# Patient Record
Sex: FEMALE | Race: WHITE
[De-identification: names, ages, dates, MRNs, and addresses within clinical notes are randomized per-mention and may not be internally consistent; named-entity substitution may affect disease eponyms.]

---

## 2019-08-04 NOTE — NUR
DR. SANTIAGO NOTIFIED AND REVIEWD PTS BEHAVIOR AND ASSESSMENT RESULTS.PT IS A
LOW RISK PER DR. SANTIAGO. DR. SANTIAGO STATED TO GIVE RESOURCES TO PT AT TIME
OF DISCHARGE. NO FURTHER ORDERS AT THIS TIME. RESOURCES REVIEWED WITH PT AND
SHE VERBALIZED UNDERSTANDING.

## 2019-10-18 NOTE — OP
PATIENT NAME:  BRAYDEN GREEN                            MEDICAL RECORD: [id number]
:[birth year]                                             LOCATION:D.OPS          
                                                         ADMISSION DATE:        
SURGEON:  ROSITA MATHEWS DO         
 
 
DATE OF OPERATION:  10/18/2019
 
PROCEDURE PERFORMED:  Right shoulder arthroscopy with subacromial decompression
and biceps tenodesis.
 
PREOPERATIVE DIAGNOSES:  Right shoulder subacromial impingement and SLAP tear
type 2.
 
POSTOPERATIVE DIAGNOSES:  Right shoulder subacromial impingement and SLAP tear
type 2.
 
INDICATIONS:  Ms. Green is a 38-year-old female who has had several months to
close to a year of shoulder pain and has hurt her for quite some time and she is
tired of dealing with the pain.  She has gone through a series of physical
therapy and injections to no avail.  She wanted something done surgically.  She
was tired of it affecting her activities of daily living.  I informed her of the
risks and benefits of the procedure including infection, bleeding, damage to
nerves and vessels, need for further surgery, continued shoulder pain, and she
signed the consent.
 
SURGEON:  Rosita Mathews DO
 
DESCRIPTION OF PROCEDURE:  The patient was taken to the operative suite, laid in
the left lateral decubitus position with the right shoulder up.  A timeout was
performed and everyone was in agreement with the correct side, site, patient and
procedure.  She was sedated and LMA was placed.  The right shoulder was then
prepped and draped in sterile fashion after the timeout had been performed and
procedure began.  She received 900 mg clindamycin preoperatively.  The shoulder
was then inflated with 60 mL of normal saline through the posterior portal with
an 18-gauge spinal needle and then the posterior portal was established with an
11-blade scalpel.  A trocar was then entered into the shoulder joint at that
time and entering the shoulder, the anterior portal was then established with an
18-gauge spinal needle and 11-blade scalpel.  Trocar was then entered in.  The
cartilage appeared to be in good condition as well as the rotator cuff, the
subscapularis, the supraspinatus, infraspinatus, and there was nothing in the
inferior gutter.  However, the labrum was torn, type 2 SLAP tear.  Burner was
then brought in and a bicep tenotomy was performed at that point.  We then
switched the scope to the subacromial space and a lateral portal was established
with an 18-gauge spinal needle and 11-blade scalpel.  A subacromial
decompression was done.  The acromion was cleaned off.  Any spurs were removed
at that time and acromioplasty was performed.  So the bursa was then removed
with the shaver through the lateral portal.  The AC joint was checked and was
not arthritic.  The attention was then drawn to the bicep tenodesis part and an
incision was made on the anterior humerus.  Careful dissection was made down to
the long head of bicep tendon.  This was pulled out through the incision and
whipstitched and then JuggerKnot was placed on the anterior humerus
unicortically.  We then put the tendon through the loop and then tied it to the
JuggerKnot suture and cinched down to the humerus and I get a nice tenodesis
site.  The suture was then cut and tied and then a free needle was used to tie
through the bicep tendon.  This was tied down.  Excess tendon and suture were
cut at that time and the site was irrigated.  It was closed with 2-0 Vicryl in
inverted interrupted fashion and 4-0 Monocryl ran on the skin and 4-0 Monocryl
 
 
 
OPERATIVE REPORT                               [id number]    BRAYDEN GREEN          
 
 
on each of the portal sites.  She was awakened and taken to recovery in stable
condition.
 
BLOOD LOSS:  Minimal.
 
COMPLICATIONS:  None.
 
TRANSINT:[id number] Voice Confirmation ID: [id number] DOCUMENT ID: [id number]
                                           
                                           ROSITA MATHEWS DO         
 
 
 
Electronically Signed by ROSITA NÚÑEZ on 10/18/19 at 1229
 
 
 
 
 
 
 
 
 
 
 
 
 
 
 
 
 
 
 
 
 
 
 
 
 
 
 
 
 
 
 
 
 
CC:                                                             [id number]
DICTATION DATE: 10/18/19 1149     :     10/18/19 1210      [id number] [place] [address]

## 2020-03-01 ENCOUNTER — HOSPITAL ENCOUNTER (EMERGENCY)
Dept: HOSPITAL 84 - D.ER | Age: 40
Discharge: HOME | End: 2020-03-01
Payer: MEDICAID

## 2020-03-01 VITALS — DIASTOLIC BLOOD PRESSURE: 99 MMHG | SYSTOLIC BLOOD PRESSURE: 151 MMHG

## 2020-03-01 VITALS
BODY MASS INDEX: 27.62 KG/M2 | HEIGHT: 67 IN | WEIGHT: 176 LBS | WEIGHT: 176 LBS | BODY MASS INDEX: 27.62 KG/M2 | HEIGHT: 67 IN

## 2020-03-01 DIAGNOSIS — I25.2: ICD-10-CM

## 2020-03-01 DIAGNOSIS — S46.812A: ICD-10-CM

## 2020-03-01 DIAGNOSIS — Z72.0: ICD-10-CM

## 2020-03-01 DIAGNOSIS — M25.512: Primary | ICD-10-CM

## 2020-03-17 ENCOUNTER — HOSPITAL ENCOUNTER (OUTPATIENT)
Dept: HOSPITAL 84 - D.MRI | Age: 40
Discharge: HOME | End: 2020-03-17
Attending: CLINICAL NURSE SPECIALIST
Payer: MEDICAID

## 2020-03-17 VITALS — BODY MASS INDEX: 20.7 KG/M2

## 2020-03-17 DIAGNOSIS — M25.512: Primary | ICD-10-CM

## 2020-03-23 ENCOUNTER — HOSPITAL ENCOUNTER (EMERGENCY)
Dept: HOSPITAL 84 - D.ER | Age: 40
Discharge: HOME | End: 2020-03-23
Payer: MEDICAID

## 2020-03-23 VITALS
BODY MASS INDEX: 28.31 KG/M2 | WEIGHT: 180.38 LBS | HEIGHT: 67 IN | BODY MASS INDEX: 28.31 KG/M2 | WEIGHT: 180.38 LBS | HEIGHT: 67 IN

## 2020-03-23 VITALS — DIASTOLIC BLOOD PRESSURE: 83 MMHG | SYSTOLIC BLOOD PRESSURE: 117 MMHG

## 2020-03-23 DIAGNOSIS — K04.7: Primary | ICD-10-CM

## 2020-03-23 DIAGNOSIS — K08.89: ICD-10-CM

## 2020-06-25 ENCOUNTER — HOSPITAL ENCOUNTER (EMERGENCY)
Dept: HOSPITAL 84 - D.ER | Age: 40
Discharge: HOME | End: 2020-06-25
Payer: MEDICAID

## 2020-06-25 VITALS — BODY MASS INDEX: 28.25 KG/M2 | HEIGHT: 67 IN | WEIGHT: 180 LBS

## 2020-06-25 VITALS — SYSTOLIC BLOOD PRESSURE: 104 MMHG | DIASTOLIC BLOOD PRESSURE: 63 MMHG

## 2020-06-25 DIAGNOSIS — D72.829: ICD-10-CM

## 2020-06-25 DIAGNOSIS — R11.0: ICD-10-CM

## 2020-06-25 DIAGNOSIS — R31.9: ICD-10-CM

## 2020-06-25 DIAGNOSIS — R10.32: Primary | ICD-10-CM

## 2020-06-25 DIAGNOSIS — R73.9: ICD-10-CM

## 2020-06-25 LAB
ALBUMIN SERPL-MCNC: 3.8 G/DL (ref 3.4–5)
ALP SERPL-CCNC: 94 U/L (ref 30–120)
ALT SERPL-CCNC: 29 U/L (ref 10–68)
AMYLASE SERPL-CCNC: 41 U/L (ref 25–115)
ANION GAP SERPL CALC-SCNC: 11.7 MMOL/L (ref 8–16)
BACTERIA #/AREA URNS HPF: (no result) /HPF
BASOPHILS NFR BLD AUTO: 0.1 % (ref 0–2)
BILIRUB SERPL-MCNC: 0.3 MG/DL (ref 0.2–1.3)
BILIRUB SERPL-MCNC: NEGATIVE MG/DL
BUN SERPL-MCNC: 12 MG/DL (ref 7–18)
CALCIUM SERPL-MCNC: 8.7 MG/DL (ref 8.5–10.1)
CHLORIDE SERPL-SCNC: 102 MMOL/L (ref 98–107)
CO2 SERPL-SCNC: 25.1 MMOL/L (ref 21–32)
CREAT SERPL-MCNC: 0.9 MG/DL (ref 0.6–1.3)
EOSINOPHIL NFR BLD: 1.5 % (ref 0–7)
ERYTHROCYTE [DISTWIDTH] IN BLOOD BY AUTOMATED COUNT: 17.2 % (ref 11.5–14.5)
GLOBULIN SER-MCNC: 3.7 G/L
GLUCOSE SERPL-MCNC: 287 MG/DL (ref 74–106)
GLUCOSE SERPL-MCNC: 500 MG/DL
HCG UR QL: NEGATIVE
HCT VFR BLD CALC: 45.2 % (ref 36–48)
HGB BLD-MCNC: 14.7 G/DL (ref 12–16)
IMM GRANULOCYTES NFR BLD: 0.3 % (ref 0–5)
KETONES UR STRIP-MCNC: NEGATIVE MG/DL
LIPASE SERPL-CCNC: 237 U/L (ref 73–393)
LYMPHOCYTES NFR BLD AUTO: 24.6 % (ref 15–50)
MCH RBC QN AUTO: 26.8 PG (ref 26–34)
MCHC RBC AUTO-ENTMCNC: 32.5 G/DL (ref 31–37)
MCV RBC: 82.5 FL (ref 80–100)
MONOCYTES NFR BLD: 6.9 % (ref 2–11)
NEUTROPHILS NFR BLD AUTO: 66.6 % (ref 40–80)
NITRITE UR-MCNC: NEGATIVE MG/ML
OSMOLALITY SERPL CALC.SUM OF ELEC: 279 MOSM/KG (ref 275–300)
PH UR STRIP: 5 [PH] (ref 5–6)
PLATELET # BLD: 292 10X3/UL (ref 130–400)
PMV BLD AUTO: 10.7 FL (ref 7.4–10.4)
POTASSIUM SERPL-SCNC: 3.8 MMOL/L (ref 3.5–5.1)
PROT SERPL-MCNC: 7.5 G/DL (ref 6.4–8.2)
RBC # BLD AUTO: 5.48 10X6/UL (ref 4–5.4)
RBC #/AREA URNS HPF: (no result) /HPF (ref 0–5)
SODIUM SERPL-SCNC: 135 MMOL/L (ref 136–145)
SP GR UR STRIP: 1.02 (ref 1–1.02)
SQUAMOUS #/AREA URNS HPF: (no result) /HPF (ref 0–5)
TROPONIN I SERPL-MCNC: < 0.017 NG/ML (ref 0–0.06)
UROBILINOGEN UR-MCNC: NORMAL MG/DL
WBC # BLD AUTO: 12.2 10X3/UL (ref 4.8–10.8)
WBC #/AREA URNS HPF: (no result) /HPF

## 2020-07-12 ENCOUNTER — HOSPITAL ENCOUNTER (EMERGENCY)
Dept: HOSPITAL 84 - D.ER | Age: 40
Discharge: HOME | End: 2020-07-12
Payer: MEDICAID

## 2020-07-12 VITALS
HEIGHT: 67 IN | BODY MASS INDEX: 29.82 KG/M2 | WEIGHT: 190 LBS | BODY MASS INDEX: 29.82 KG/M2 | WEIGHT: 190 LBS | HEIGHT: 67 IN

## 2020-07-12 VITALS — SYSTOLIC BLOOD PRESSURE: 116 MMHG | DIASTOLIC BLOOD PRESSURE: 70 MMHG

## 2020-07-12 DIAGNOSIS — N23: ICD-10-CM

## 2020-07-12 DIAGNOSIS — E11.65: Primary | ICD-10-CM

## 2020-07-12 LAB
ALBUMIN SERPL-MCNC: 3.4 G/DL (ref 3.4–5)
ALP SERPL-CCNC: 98 U/L (ref 30–120)
ALT SERPL-CCNC: 24 U/L (ref 10–68)
ANION GAP SERPL CALC-SCNC: 11.9 MMOL/L (ref 8–16)
BILIRUB SERPL-MCNC: 0.27 MG/DL (ref 0.2–1.3)
BILIRUB SERPL-MCNC: NEGATIVE MG/DL
BUN SERPL-MCNC: 11 MG/DL (ref 7–18)
CALCIUM SERPL-MCNC: 8.7 MG/DL (ref 8.5–10.1)
CHLORIDE SERPL-SCNC: 99 MMOL/L (ref 98–107)
CO2 SERPL-SCNC: 24.7 MMOL/L (ref 21–32)
CREAT SERPL-MCNC: 1 MG/DL (ref 0.6–1.3)
ERYTHROCYTE [DISTWIDTH] IN BLOOD BY AUTOMATED COUNT: 16.4 % (ref 11.5–14.5)
EST. AVERAGE GLUCOSE BLD GHB EST-MCNC: 269 MG/DL (ref 74–154)
GLOBULIN SER-MCNC: 3.3 G/L
GLUCOSE SERPL-MCNC: 1000 MG/DL
GLUCOSE SERPL-MCNC: 412 MG/DL (ref 74–106)
HCG SERPL-ACNC: 0 MIU/ML
HCT VFR BLD CALC: 42.2 % (ref 36–48)
HGB BLD-MCNC: 13.7 G/DL (ref 12–16)
KETONES UR STRIP-MCNC: NEGATIVE MG/DL
LIPASE SERPL-CCNC: 190 U/L (ref 73–393)
LYMPHOCYTES NFR BLD AUTO: 23.7 % (ref 15–50)
MCH RBC QN AUTO: 27.1 PG (ref 26–34)
MCHC RBC AUTO-ENTMCNC: 32.5 G/DL (ref 31–37)
MCV RBC: 83.4 FL (ref 80–100)
NEUTROPHILS NFR BLD AUTO: 70.8 % (ref 40–80)
NITRITE UR-MCNC: NEGATIVE MG/ML
OSMOLALITY SERPL CALC.SUM OF ELEC: 280 MOSM/KG (ref 275–300)
PH UR STRIP: 6 [PH] (ref 5–6)
PLATELET # BLD: 270 10X3/UL (ref 130–400)
PMV BLD AUTO: 10.5 FL (ref 7.4–10.4)
POTASSIUM SERPL-SCNC: 3.6 MMOL/L (ref 3.5–5.1)
PROT SERPL-MCNC: 6.7 G/DL (ref 6.4–8.2)
RBC # BLD AUTO: 5.06 10X6/UL (ref 4–5.4)
SODIUM SERPL-SCNC: 132 MMOL/L (ref 136–145)
SP GR UR STRIP: 1.01 (ref 1–1.02)
UROBILINOGEN UR-MCNC: NORMAL MG/DL
WBC # BLD AUTO: 10.2 10X3/UL (ref 4.8–10.8)

## 2020-08-18 ENCOUNTER — HOSPITAL ENCOUNTER (EMERGENCY)
Dept: HOSPITAL 84 - D.ER | Age: 40
Discharge: LEFT BEFORE DISCHARGE | End: 2020-08-18
Payer: MEDICAID

## 2020-08-18 VITALS — WEIGHT: 170.36 LBS | BODY MASS INDEX: 26.74 KG/M2 | HEIGHT: 67 IN

## 2020-08-18 VITALS — SYSTOLIC BLOOD PRESSURE: 132 MMHG | DIASTOLIC BLOOD PRESSURE: 88 MMHG

## 2020-08-18 DIAGNOSIS — M25.511: Primary | ICD-10-CM

## 2020-09-21 ENCOUNTER — HOSPITAL ENCOUNTER (OUTPATIENT)
Dept: HOSPITAL 84 - D.MRI | Age: 40
Discharge: HOME | End: 2020-09-21
Attending: ORTHOPAEDIC SURGERY
Payer: MEDICAID

## 2020-09-21 VITALS — BODY MASS INDEX: 26.7 KG/M2

## 2020-09-21 DIAGNOSIS — M25.511: Primary | ICD-10-CM

## 2020-10-04 ENCOUNTER — HOSPITAL ENCOUNTER (EMERGENCY)
Dept: HOSPITAL 84 - D.ER | Age: 40
Discharge: HOME | End: 2020-10-04
Payer: MEDICAID

## 2020-10-04 VITALS — WEIGHT: 165.35 LBS | HEIGHT: 67 IN | BODY MASS INDEX: 25.95 KG/M2

## 2020-10-04 VITALS — SYSTOLIC BLOOD PRESSURE: 151 MMHG | DIASTOLIC BLOOD PRESSURE: 96 MMHG

## 2020-10-04 DIAGNOSIS — M25.511: Primary | ICD-10-CM

## 2020-10-08 ENCOUNTER — HOSPITAL ENCOUNTER (OUTPATIENT)
Dept: HOSPITAL 84 - D.OPS | Age: 40
Discharge: HOME | End: 2020-10-08
Attending: ORTHOPAEDIC SURGERY
Payer: MEDICAID

## 2020-10-08 VITALS — HEIGHT: 67 IN | BODY MASS INDEX: 28.25 KG/M2 | WEIGHT: 180 LBS

## 2020-10-08 DIAGNOSIS — Z79.84: ICD-10-CM

## 2020-10-08 DIAGNOSIS — Z86.73: ICD-10-CM

## 2020-10-08 DIAGNOSIS — M25.511: Primary | ICD-10-CM

## 2020-10-08 DIAGNOSIS — M75.41: ICD-10-CM

## 2020-10-08 DIAGNOSIS — E11.9: ICD-10-CM

## 2020-10-08 LAB
ERYTHROCYTE [DISTWIDTH] IN BLOOD BY AUTOMATED COUNT: 14.7 % (ref 11.5–14.5)
HCG SERPL-ACNC: NEGATIVE M[IU]/ML
HCT VFR BLD CALC: 41.3 % (ref 36–48)
HGB BLD-MCNC: 13.3 G/DL (ref 12–16)
MCH RBC QN AUTO: 27 PG (ref 26–34)
MCHC RBC AUTO-ENTMCNC: 32.2 G/DL (ref 31–37)
MCV RBC: 83.9 FL (ref 80–100)
PLATELET # BLD: 329 10X3/UL (ref 130–400)
PMV BLD AUTO: 10.6 FL (ref 7.4–10.4)
RBC # BLD AUTO: 4.92 10X6/UL (ref 4–5.4)
WBC # BLD AUTO: 13.7 10X3/UL (ref 4.8–10.8)

## 2020-10-08 NOTE — OP
PATIENT NAME:  BRAYDEN GREEN                          MEDICAL RECORD: [id number]
:[birth year]                                             LOCATION:D.OPS          
                                                         ADMISSION DATE:        
SURGEON:  LILLIAN MITCHELL MD        
 
 
DATE OF OPERATION:  10/08/2020
 
PREOPERATIVE DIAGNOSIS:  Impingement syndrome of the right shoulder with labral
tearing.
 
POSTOPERATIVE DIAGNOSIS:  Impingement syndrome of the right shoulder with labral
tearing.
 
PROCEDURES:
1.  Right shoulder arthroscopy with circumferential labral debridement.
2.  Arthroscopic distal clavicle excision done through separate incision -- 1
cm.
3.  Arthroscopic subacromial decompression with acromioplasty and bursectomy.
 
SURGEON:  Lillian Mitchell MD
 
ANESTHESIA:  General.
 
INTRAOPERATIVE COMPLICATIONS:  None.
 
SUMMARY OF PATHOLOGIC FINDINGS:  The patient was indeed found to have
substantial labral tearing.  It was not detached from the bone, but she had both
radial beak and 1 bucket-handle type tear that was debrided.  Furthermore, the
patient had chondromalacia of the distal clavicle as well as a downward sloping
acromion with excoriation of the coracoacromial ligament.
 
OPERATIVE SUMMARY IN DETAIL:  After obtaining appropriate preoperative
orthopedic surgery consent as well as anesthetic consultation, evaluation and
clearance, the patient was brought to the operating room and placed on the
operating table in supine position.  After general laryngeal mask airway was
administered, the patient was placed in a left lateral decubitus position.  All
pressure points were well padded to include down leg peroneal pad as well as
axillary roll.  The patient was held firmly to the operating table using the
vacuum pack suction system.  Right upper extremity and shoulder were then
prepped and draped in routine sterile fashion.  The arm was held in the Arthrex
traction boom at 30 degrees of forward flexion, 30 degrees of abduction, 10
pounds of traction laterally.  At this point, the appropriate timeout was taken
and agreed upon by all  given the patient's unique identifiers.
 
Arthroscopy was established in the glenohumeral joint from a posterior portal. 
Anterior portal was established in the anterior safe interval.  Diagnostic
arthroscopy showed the labral tearing.  Gentle labral debridement was taken
around circumferentially until no loose labral elements were noted. 
Intraarticularly, the patient did not have a substantial amount of cartilage. 
The articular aspect of the rotator cuff had no evidence of tearing and was in
good health.  Attention was then turned to the subacromial space.
 
While on the subacromial space, accessory lateral portal was created through
which the [place] tissue ablation system was utilized to denude the undersurface
of the acromion of all soft tissue elements and release the coracoacromial
ligament.  Having completed this, a 5-0 barrel bur was then used to perform
acromioplasty at the level of acromioclavicular joint.  Then, through a separate
 
 
 
OPERATIVE REPORT                               [id number]    BRAYDEN GREEN        
 
 
anterior arthroscopic portal.  Under direct arthroscopic visualization, distal
clavicle was excised for 1 cm.  Having completed this, arthroscopy portals were
closed in routine interrupted fashion using 4-0 Prolene.  Sterile dressings were
applied.  The patient was awakened and taken to recovery room in stable
condition.  All final needle and sponge counts were correct.
 
TRANSINT:[id number] Voice Confirmation ID: [id number] DOCUMENT ID: [id number]
                                           
                                           STEPHEN CANALES, LILLIAN CONWAY        
 
 
 
 
 
 
 
 
 
 
 
 
 
 
 
 
 
 
 
 
 
 
 
 
 
 
 
 
 
 
 
 
 
 
 
 
 
 
CC:                                                             [id number]
DICTATION DATE: 10/22/20 1353     :     10/22/20 1531      [facility] 
                                                                      10/08/20
[id number] [place] [address]

## 2020-10-08 NOTE — NUR
1040
IV D/C'D WITH CANNULA INTACT, PRESSURE HELD AND DRSG PLACED.
DISCHARGE INSTRUCTIONS GIVEN AND PT VERBALIZED AB UNDERSTANDING.
DISCHARGED IN STABLE CONDITION AND W/O C/O

## 2020-10-15 ENCOUNTER — HOSPITAL ENCOUNTER (EMERGENCY)
Dept: HOSPITAL 84 - D.ER | Age: 40
LOS: 1 days | Discharge: HOME | End: 2020-10-16
Payer: MEDICAID

## 2020-10-15 VITALS — BODY MASS INDEX: 26.74 KG/M2 | WEIGHT: 170.36 LBS | HEIGHT: 67 IN

## 2020-10-15 DIAGNOSIS — E11.9: ICD-10-CM

## 2020-10-15 DIAGNOSIS — M25.511: ICD-10-CM

## 2020-10-15 DIAGNOSIS — M54.12: Primary | ICD-10-CM

## 2020-10-15 LAB
ANION GAP SERPL CALC-SCNC: 15.8 MMOL/L (ref 8–16)
BUN SERPL-MCNC: 13 MG/DL (ref 7–18)
CALCIUM SERPL-MCNC: 8.9 MG/DL (ref 8.5–10.1)
CHLORIDE SERPL-SCNC: 102 MMOL/L (ref 98–107)
CO2 SERPL-SCNC: 23.8 MMOL/L (ref 21–32)
CREAT SERPL-MCNC: 0.9 MG/DL (ref 0.6–1.3)
OSMOLALITY SERPL CALC.SUM OF ELEC: 285 MOSM/KG (ref 275–300)
POTASSIUM SERPL-SCNC: 3.6 MMOL/L (ref 3.5–5.1)
SODIUM SERPL-SCNC: 138 MMOL/L (ref 136–145)

## 2020-10-16 VITALS — DIASTOLIC BLOOD PRESSURE: 77 MMHG | SYSTOLIC BLOOD PRESSURE: 125 MMHG

## 2020-10-16 LAB
ALBUMIN SERPL-MCNC: 3.6 G/DL (ref 3.4–5)
ALP SERPL-CCNC: 99 U/L (ref 30–120)
ALT SERPL-CCNC: 19 U/L (ref 10–68)
BASOPHILS NFR BLD AUTO: 0.2 % (ref 0–2)
BILIRUB SERPL-MCNC: 0.13 MG/DL (ref 0.2–1.3)
CRP SERPL-MCNC: 0.8 MG/DL (ref 0–0.9)
EOSINOPHIL NFR BLD: 2.2 % (ref 0–7)
ERYTHROCYTE [DISTWIDTH] IN BLOOD BY AUTOMATED COUNT: 14.5 % (ref 11.5–14.5)
ERYTHROCYTE [SEDIMENTATION RATE] IN BLOOD: 11 MM/HR (ref 0–20)
GLOBULIN SER-MCNC: 3.6 G/L
GLUCOSE SERPL-MCNC: 275 MG/DL (ref 74–106)
HCT VFR BLD CALC: 39.8 % (ref 36–48)
HGB BLD-MCNC: 12.8 G/DL (ref 12–16)
IMM GRANULOCYTES NFR BLD: 0.2 % (ref 0–5)
LYMPHOCYTES NFR BLD AUTO: 24.6 % (ref 15–50)
MCH RBC QN AUTO: 26.7 PG (ref 26–34)
MCHC RBC AUTO-ENTMCNC: 32.2 G/DL (ref 31–37)
MCV RBC: 82.9 FL (ref 80–100)
MONOCYTES NFR BLD: 6.3 % (ref 2–11)
NEUTROPHILS NFR BLD AUTO: 66.5 % (ref 40–80)
PLATELET # BLD: 352 10X3/UL (ref 130–400)
PMV BLD AUTO: 10.1 FL (ref 7.4–10.4)
PROT SERPL-MCNC: 7.2 G/DL (ref 6.4–8.2)
RBC # BLD AUTO: 4.8 10X6/UL (ref 4–5.4)
WBC # BLD AUTO: 12.8 10X3/UL (ref 4.8–10.8)

## 2021-04-17 ENCOUNTER — HOSPITAL ENCOUNTER (EMERGENCY)
Dept: HOSPITAL 84 - D.ER | Age: 41
Discharge: HOME | End: 2021-04-17
Payer: MEDICAID

## 2021-04-17 VITALS — HEIGHT: 67 IN | WEIGHT: 179.38 LBS | BODY MASS INDEX: 28.16 KG/M2

## 2021-04-17 VITALS — DIASTOLIC BLOOD PRESSURE: 94 MMHG | SYSTOLIC BLOOD PRESSURE: 134 MMHG

## 2021-04-17 DIAGNOSIS — M25.511: ICD-10-CM

## 2021-04-17 DIAGNOSIS — S96.911A: ICD-10-CM
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
## 2021-04-17 DIAGNOSIS — E11.9: ICD-10-CM

## 2021-04-17 DIAGNOSIS — S40.011A: Primary | ICD-10-CM
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
## 2021-04-17 DIAGNOSIS — M79.10: ICD-10-CM

## 2021-04-17 DIAGNOSIS — S93.601A: ICD-10-CM
